# Patient Record
Sex: MALE | Race: WHITE | NOT HISPANIC OR LATINO | Employment: OTHER | URBAN - METROPOLITAN AREA
[De-identification: names, ages, dates, MRNs, and addresses within clinical notes are randomized per-mention and may not be internally consistent; named-entity substitution may affect disease eponyms.]

---

## 2021-12-20 ENCOUNTER — OFFICE VISIT (OUTPATIENT)
Dept: CARDIOLOGY | Facility: CLINIC | Age: 71
End: 2021-12-20
Payer: MEDICARE

## 2021-12-20 DIAGNOSIS — I63.231 CEREBROVASCULAR ACCIDENT (CVA) DUE TO STENOSIS OF RIGHT CAROTID ARTERY: ICD-10-CM

## 2021-12-20 PROBLEM — I63.9 CVA (CEREBRAL VASCULAR ACCIDENT): Status: ACTIVE | Noted: 2021-12-20

## 2021-12-20 PROCEDURE — 99203 PR OFFICE/OUTPT VISIT, NEW, LEVL III, 30-44 MIN: ICD-10-PCS | Mod: 95,,, | Performed by: INTERNAL MEDICINE

## 2021-12-20 PROCEDURE — 99203 OFFICE O/P NEW LOW 30 MIN: CPT | Mod: 95,,, | Performed by: INTERNAL MEDICINE

## 2022-01-07 ENCOUNTER — DOCUMENTATION ONLY (OUTPATIENT)
Dept: CARDIOLOGY | Facility: CLINIC | Age: 72
End: 2022-01-07
Payer: MEDICARE

## 2022-01-07 DIAGNOSIS — I63.231 CEREBROVASCULAR ACCIDENT (CVA) DUE TO STENOSIS OF RIGHT CAROTID ARTERY: Primary | ICD-10-CM

## 2022-01-07 RX ORDER — ALLOPURINOL 300 MG/1
300 TABLET ORAL DAILY
COMMUNITY
Start: 2021-08-05 | End: 2022-08-06

## 2022-01-07 RX ORDER — CLOPIDOGREL BISULFATE 75 MG/1
75 TABLET ORAL DAILY
COMMUNITY
Start: 2021-12-08

## 2022-01-07 RX ORDER — AMLODIPINE AND BENAZEPRIL HYDROCHLORIDE 5; 10 MG/1; MG/1
10 CAPSULE ORAL DAILY
Status: ON HOLD | COMMUNITY
Start: 2021-08-05 | End: 2022-04-22 | Stop reason: HOSPADM

## 2022-01-07 RX ORDER — CHOLECALCIFEROL (VITAMIN D3) 50 MCG
1000 CAPSULE ORAL 2 TIMES DAILY
COMMUNITY

## 2022-01-07 RX ORDER — SIMVASTATIN 80 MG/1
80 TABLET, FILM COATED ORAL DAILY
COMMUNITY
Start: 2021-08-05

## 2022-01-07 RX ORDER — ASPIRIN 81 MG/1
81 TABLET ORAL DAILY
COMMUNITY
Start: 2021-12-08 | End: 2022-12-09

## 2022-01-07 NOTE — PROGRESS NOTES
"OUTPATIENT CATHETERIZATION INSTRUCTIONS    You have been scheduled for a procedure in the catheterization lab on Monday, January 31, 2022.     Please report to the Cardiology Waiting Area on the Third floor of the hospital and check in at 6 AM.   You will then be taken to the SSCU (Short Stay Cardiac Unit) and prepared for your procedure. Please be aware that this is not the time of your procedure but the time you are to arrive. The procedures are scheduled on an hourly basis; however, emergency cases take precedence over all other cases.       You may not have anything to eat or drink after midnight the night before your test. You may take your regular morning medications with water. If there are any medications that you should not take you will be instructed to hold them that morning. If you are diabetic and on Metformin (Glucophage) do not take it the day before, the day of, and for 2 days after your procedure.      The procedure will take 1-2 hours to perform. After the procedure, you will return to SSCU on the third floor of the hospital. You will need to lie still (or keep your arm still) for the next 4 to 6 hours to minimize bleeding from the puncture site. Your family may remain in the room with you during this time.       You may be able to be discharged home that same afternoon if there is someone to drive you home and there were no complications. If you have one of the balloon, stent, or device procedures you may spend the night in the hospital. Your doctor will determine, based on your progress, the date and time of your discharge. The results of your procedure will be discussed with you before you are discharged. Any further testing or procedures will be scheduled for you either before you leave or you will be called with these appointments.       If you should have any questions, concerns, or need to change the date of your procedure, please call "CHACHO Steele @ [phone]    Special " Instructions:    Continue taking your prescribed medications.    Take your Aspirin and Plavix the morning of your procedure.    Get your labs and Covid test completed by Friday January 28, 2022.   Have them fax the results to [phone] ATTN: Ivette    Plan for a overnight stay in the hospital the day of your procedure.        THE ABOVE INSTRUCTIONS WERE GIVEN TO THE PATIENT VERBALLY AND THEY VERBALIZED UNDERSTANDING.  THEY DO NOT REQUIRE ANY SPECIAL NEEDS AND DO NOT HAVE ANY LEARNING BARRIERS.          Directions for Reporting to Cardiology Waiting Area in the Hospital  If you park in the Parking Garage:  Take elevators to the1st floor of the parking garage.  Continue past the gift shop, coffee shop, and piano.  Take a right and go to the gold elevators. (Elevator B)  Take the elevator to the 3rd floor.  Follow the arrow on the sign on the wall that says Cath Lab Registration/EP Lab Registration.  Follow the long hallway all the way around until you come to a big open area.  This is the registration area.  Check in at Reception Desk.    OR    If family is dropping you off:  Have them drop you off at the front of the Hospital under the green overhang.  Enter through the doors and take a right.  Take the E elevators to the 3rd floor Cardiology Waiting Area.  Check in at the Reception Desk in the waiting room.

## 2022-01-31 ENCOUNTER — HOSPITAL ENCOUNTER (INPATIENT)
Facility: HOSPITAL | Age: 72
LOS: 1 days | Discharge: HOME | DRG: 068 | End: 2022-01-31
Attending: INTERNAL MEDICINE | Admitting: INTERNAL MEDICINE
Payer: MEDICARE

## 2022-01-31 VITALS
OXYGEN SATURATION: 98 % | BODY MASS INDEX: 27.2 KG/M2 | HEIGHT: 70 IN | DIASTOLIC BLOOD PRESSURE: 63 MMHG | RESPIRATION RATE: 18 BRPM | WEIGHT: 190 LBS | SYSTOLIC BLOOD PRESSURE: 136 MMHG | HEART RATE: 83 BPM | TEMPERATURE: 99 F

## 2022-01-31 DIAGNOSIS — I65.23 BILATERAL CAROTID ARTERY STENOSIS: ICD-10-CM

## 2022-01-31 DIAGNOSIS — I63.231 CEREBROVASCULAR ACCIDENT (CVA) DUE TO STENOSIS OF RIGHT CAROTID ARTERY: Primary | ICD-10-CM

## 2022-01-31 DIAGNOSIS — Z51.89 ENCOUNTER FOR OTHER SPECIFIED AFTERCARE: ICD-10-CM

## 2022-01-31 DIAGNOSIS — Z01.810 PRE-PROCEDURAL CARDIOVASCULAR EXAMINATION: ICD-10-CM

## 2022-01-31 LAB
ABO + RH BLD: NORMAL
ANION GAP SERPL CALC-SCNC: 8 MMOL/L (ref 8–16)
BASOPHILS # BLD AUTO: 0.02 K/UL (ref 0–0.2)
BASOPHILS NFR BLD: 0.3 % (ref 0–1.9)
BLD GP AB SCN CELLS X3 SERPL QL: NORMAL
BUN SERPL-MCNC: 32 MG/DL (ref 8–23)
CALCIUM SERPL-MCNC: 9.7 MG/DL (ref 8.7–10.5)
CHLORIDE SERPL-SCNC: 110 MMOL/L (ref 95–110)
CO2 SERPL-SCNC: 23 MMOL/L (ref 23–29)
CREAT SERPL-MCNC: 1.8 MG/DL (ref 0.5–1.4)
DIFFERENTIAL METHOD: ABNORMAL
EOSINOPHIL # BLD AUTO: 0.1 K/UL (ref 0–0.5)
EOSINOPHIL NFR BLD: 1.6 % (ref 0–8)
ERYTHROCYTE [DISTWIDTH] IN BLOOD BY AUTOMATED COUNT: 12.8 % (ref 11.5–14.5)
EST. GFR  (AFRICAN AMERICAN): 42.8 ML/MIN/1.73 M^2
EST. GFR  (NON AFRICAN AMERICAN): 37 ML/MIN/1.73 M^2
GLUCOSE SERPL-MCNC: 187 MG/DL (ref 70–110)
HCT VFR BLD AUTO: 43 % (ref 40–54)
HGB BLD-MCNC: 14.8 G/DL (ref 14–18)
IMM GRANULOCYTES # BLD AUTO: 0.03 K/UL (ref 0–0.04)
IMM GRANULOCYTES NFR BLD AUTO: 0.5 % (ref 0–0.5)
LYMPHOCYTES # BLD AUTO: 1 K/UL (ref 1–4.8)
LYMPHOCYTES NFR BLD: 16.2 % (ref 18–48)
MCH RBC QN AUTO: 30 PG (ref 27–31)
MCHC RBC AUTO-ENTMCNC: 34.4 G/DL (ref 32–36)
MCV RBC AUTO: 87 FL (ref 82–98)
MONOCYTES # BLD AUTO: 0.5 K/UL (ref 0.3–1)
MONOCYTES NFR BLD: 8.3 % (ref 4–15)
NEUTROPHILS # BLD AUTO: 4.6 K/UL (ref 1.8–7.7)
NEUTROPHILS NFR BLD: 73.1 % (ref 38–73)
NRBC BLD-RTO: 0 /100 WBC
PLATELET # BLD AUTO: 109 K/UL (ref 150–450)
PMV BLD AUTO: 10.7 FL (ref 9.2–12.9)
POCT GLUCOSE: 201 MG/DL (ref 70–110)
POTASSIUM SERPL-SCNC: 4.6 MMOL/L (ref 3.5–5.1)
RBC # BLD AUTO: 4.93 M/UL (ref 4.6–6.2)
SODIUM SERPL-SCNC: 141 MMOL/L (ref 136–145)
WBC # BLD AUTO: 6.23 K/UL (ref 3.9–12.7)

## 2022-01-31 PROCEDURE — 25000003 PHARM REV CODE 250: Performed by: INTERNAL MEDICINE

## 2022-01-31 PROCEDURE — C1769 GUIDE WIRE: HCPCS | Performed by: INTERNAL MEDICINE

## 2022-01-31 PROCEDURE — 27201423 OPTIME MED/SURG SUP & DEVICES STERILE SUPPLY: Performed by: INTERNAL MEDICINE

## 2022-01-31 PROCEDURE — 36223 PLACE CATH CAROTID/INOM ART: CPT | Mod: 50,,, | Performed by: INTERNAL MEDICINE

## 2022-01-31 PROCEDURE — 80048 BASIC METABOLIC PNL TOTAL CA: CPT | Performed by: INTERNAL MEDICINE

## 2022-01-31 PROCEDURE — 85025 COMPLETE CBC W/AUTO DIFF WBC: CPT | Performed by: INTERNAL MEDICINE

## 2022-01-31 PROCEDURE — 11000001 HC ACUTE MED/SURG PRIVATE ROOM

## 2022-01-31 PROCEDURE — 63600175 PHARM REV CODE 636 W HCPCS: Performed by: INTERNAL MEDICINE

## 2022-01-31 PROCEDURE — 36223 PR ANGIO INTRCRANL ART +/- CERVIOCEREBRAL ARCH, CAROTID/INNOM ART,  SELCETV CATH, S&I: ICD-10-PCS | Mod: 50,,, | Performed by: INTERNAL MEDICINE

## 2022-01-31 PROCEDURE — 99233 SBSQ HOSP IP/OBS HIGH 50: CPT | Mod: GC,,, | Performed by: INTERNAL MEDICINE

## 2022-01-31 PROCEDURE — 36223 PLACE CATH CAROTID/INOM ART: CPT | Mod: 50 | Performed by: INTERNAL MEDICINE

## 2022-01-31 PROCEDURE — C1760 CLOSURE DEV, VASC: HCPCS | Performed by: INTERNAL MEDICINE

## 2022-01-31 PROCEDURE — 99233 PR SUBSEQUENT HOSPITAL CARE,LEVL III: ICD-10-PCS | Mod: GC,,, | Performed by: INTERNAL MEDICINE

## 2022-01-31 PROCEDURE — C1894 INTRO/SHEATH, NON-LASER: HCPCS | Performed by: INTERNAL MEDICINE

## 2022-01-31 PROCEDURE — 86850 RBC ANTIBODY SCREEN: CPT | Performed by: INTERNAL MEDICINE

## 2022-01-31 PROCEDURE — 25500020 PHARM REV CODE 255: Performed by: INTERNAL MEDICINE

## 2022-01-31 RX ORDER — HEPARIN SODIUM 1000 [USP'U]/ML
INJECTION, SOLUTION INTRAVENOUS; SUBCUTANEOUS
Status: DISCONTINUED | OUTPATIENT
Start: 2022-01-31 | End: 2022-01-31 | Stop reason: HOSPADM

## 2022-01-31 RX ORDER — GLIPIZIDE 10 MG/1
10 TABLET, FILM COATED, EXTENDED RELEASE ORAL 2 TIMES DAILY
COMMUNITY

## 2022-01-31 RX ORDER — ONDANSETRON 8 MG/1
8 TABLET, ORALLY DISINTEGRATING ORAL EVERY 8 HOURS PRN
Status: DISCONTINUED | OUTPATIENT
Start: 2022-01-31 | End: 2022-01-31 | Stop reason: HOSPADM

## 2022-01-31 RX ORDER — LIDOCAINE HYDROCHLORIDE 20 MG/ML
INJECTION, SOLUTION EPIDURAL; INFILTRATION; INTRACAUDAL; PERINEURAL
Status: DISCONTINUED | OUTPATIENT
Start: 2022-01-31 | End: 2022-01-31 | Stop reason: HOSPADM

## 2022-01-31 RX ORDER — DIPHENHYDRAMINE HCL 50 MG
50 CAPSULE ORAL ONCE
Status: COMPLETED | OUTPATIENT
Start: 2022-01-31 | End: 2022-01-31

## 2022-01-31 RX ORDER — NAPROXEN SODIUM 220 MG/1
81 TABLET, FILM COATED ORAL DAILY
Status: DISCONTINUED | OUTPATIENT
Start: 2022-01-31 | End: 2022-01-31 | Stop reason: HOSPADM

## 2022-01-31 RX ORDER — CEFAZOLIN SODIUM 1 G/3ML
INJECTION, POWDER, FOR SOLUTION INTRAMUSCULAR; INTRAVENOUS
Status: DISCONTINUED | OUTPATIENT
Start: 2022-01-31 | End: 2022-01-31 | Stop reason: HOSPADM

## 2022-01-31 RX ORDER — HEPARIN SOD,PORCINE/0.9 % NACL 1000/500ML
INTRAVENOUS SOLUTION INTRAVENOUS
Status: DISCONTINUED | OUTPATIENT
Start: 2022-01-31 | End: 2022-01-31 | Stop reason: HOSPADM

## 2022-01-31 RX ORDER — SODIUM CHLORIDE 9 MG/ML
INJECTION, SOLUTION INTRAVENOUS CONTINUOUS
Status: ACTIVE | OUTPATIENT
Start: 2022-01-31 | End: 2022-01-31

## 2022-01-31 RX ORDER — SODIUM CHLORIDE 9 MG/ML
INJECTION, SOLUTION INTRAVENOUS
Status: DISCONTINUED | OUTPATIENT
Start: 2022-01-31 | End: 2022-01-31 | Stop reason: HOSPADM

## 2022-01-31 RX ORDER — ACETAMINOPHEN 325 MG/1
650 TABLET ORAL EVERY 4 HOURS PRN
Status: DISCONTINUED | OUTPATIENT
Start: 2022-01-31 | End: 2022-01-31 | Stop reason: HOSPADM

## 2022-01-31 RX ORDER — CLOPIDOGREL BISULFATE 75 MG/1
75 TABLET ORAL DAILY
Status: DISCONTINUED | OUTPATIENT
Start: 2022-01-31 | End: 2022-01-31 | Stop reason: HOSPADM

## 2022-01-31 RX ORDER — TAMSULOSIN HYDROCHLORIDE 0.4 MG/1
CAPSULE ORAL DAILY
COMMUNITY

## 2022-01-31 RX ORDER — SODIUM CHLORIDE 9 MG/ML
INJECTION, SOLUTION INTRAVENOUS CONTINUOUS
Status: ACTIVE | OUTPATIENT
Start: 2022-01-31

## 2022-01-31 RX ADMIN — CLOPIDOGREL 75 MG: 75 TABLET, FILM COATED ORAL at 07:01

## 2022-01-31 RX ADMIN — SODIUM CHLORIDE: 0.9 INJECTION, SOLUTION INTRAVENOUS at 07:01

## 2022-01-31 RX ADMIN — ASPIRIN 81 MG CHEWABLE TABLET 81 MG: 81 TABLET CHEWABLE at 07:01

## 2022-01-31 RX ADMIN — DIPHENHYDRAMINE HYDROCHLORIDE 50 MG: 50 CAPSULE ORAL at 07:01

## 2022-01-31 NOTE — H&P
[facility], [place]  H&P  Interventional Cardiology      Migue Reason  YOB: 1950  Medical Record Number:  [id number]  Attending Physician:  Maurilio Howard MD   Date of Admission: 1/31/2022       Hospital Day:  0  Current Principal Problem:  <principal problem not specified>      History     Cc: SANGEETA stenosis    HPI  This is a 71-year-old male with history of CAD status post CABG in 2019 and Reny Arabia, HTN, DM,  familial hypercholesterolemia, prostate cancer s/p prostatectomy 2010 (currently undergoing radiation with 12 treatments left), and CVA x3 January 2019, February 2019, and October 2021 who presents to hospital for right internal carotid artery stenting.  Patient recently had carotid artery enterectomy in November, 2021 with repeat attempt in December, unsuccessful  which failed due to high lesion requiring disarticulation of the mandible to approach.  [facility]  Currently he denies any chest discomfort such as pain, tightness, or pressure is.  He denies the symptoms at rest as well as on exertion along with dyspnea.  He is compliant with his medications.  He does have residual left upper and lower extremity weakness from previous CVA.  He is working with physical therapy.       Medications - Outpatient  Prior to Admission medications    Medication Sig Start Date End Date Taking? Authorizing Provider   allopurinoL (ZYLOPRIM) 300 MG tablet Take 300 mg by mouth once daily. 8/5/21 8/6/22  Historical Provider   amlodipine-benazepril 5-10 mg (LOTREL) 5-10 mg per capsule Take 10 capsules by mouth once daily. 8/5/21 8/6/22  Historical Provider   aspirin (ECOTRIN) 81 MG EC tablet Take 81 mg by mouth once daily. 12/8/21 12/9/22  Historical Provider   clopidogreL (PLAVIX) 75 mg tablet Take 75 mg by mouth once daily. 12/8/21   Historical Provider   omega 3-dha-epa-fish oil (FISH OIL) 100-160-1,000 mg Cap Take 1,000 mg by mouth 2 (two)  times a day.    Historical Provider   simvastatin (ZOCOR) 80 MG tablet Take 80 mg by mouth once daily. 8/5/21   Historical Provider         Medications - Current  Scheduled Meds:   diphenhydrAMINE  50 mg Oral Once     Continuous Infusions:   sodium chloride 0.9%       PRN Meds:.      Allergies  Review of patient's allergies indicates:  No Known Allergies      Past Medical History  No past medical history on file.      Past Surgical History  No past surgical history on file.      Social History  Social History     Socioeconomic History    Marital status:          ROS   Admits Denies   Constitutional  Chills, diaphoresis, malaise   Eyes  Visual changes   ENMT  Dysphagia, Epistaxis, nasal congestion, hearing loss   Cardiovascular  Chest pain, palpitations, edema   Respiratory  Cough, dyspnea, wheezing   Gastrointestinal  Nausea, vomiting, constipation, diarrhea, anorexia.     Genitourinary  Dysuria, incontinence   Musculoskeletal  Myalgias, joint pain, joint swelling   Integumentary  Rash, inflammation, burning   Neruo-Psychiatric  Anxiety, insomnia.  Changes in speech, strength, sensation.     Endocrine     Hematologic  Abnormal bruising, bleeding   Immunologic  Inflammation, pain at IV sites.  Pruritis.         Physical Examination         Vital Signs             24 Hour VS Range       No intake or output data in the 24 hours ending 01/31/22 0656      General:  Lying in bed no acute distress  Head: NCAT  Eyes: conjunctivae and lids normal, no scleral icterus, EOMI.  ENMT:  no gingival bleeding, normal oral mucosa without pallor or cyanosis.   Neck:  JVP normal.  Trachea non-displaced.  3 cm incision site on right side of neck, healed without any swelling, erythema, or tenderness.    Chest:  Normal respiratory effort.  Chest clear to auscultation.  No wheezes, rales, or rhonchi.  Heart:  Normal PMI, S1 S2 normal quality, splitting.  No murmurs rubs or gallops.  Peripheral pulses 2+ in left carotid, bilateral  radials, bilateral femoral, left DP/PT.  1+ pulses in right DP/PT.  1+ right carotid.  Abdomen:  Non-distended, normal bowel sounds, non-tender.  No hepato-jugular reflux.  Extremities:  No edema. Normal capillary refill.    Skin:  Warm and dry.  No cyanosis or pallor.  No ulcers, stasis.  IV sites without tenderness or inflammation.    Neurological / Psychiatric:  Oriented to person, time, and place.  No facial asymmetry, drift.  Fluent without dysarthria.  Mood euthymic, affect normal.         Assessment & Plan     Right internal carotid artery stenosis  S/p carotid enterectomy attempt.  Patient deemed inoperable due to high anatomical risk now referred for carotid stenting due to inoperable anatomically.  -- PCI of  SANGEETA  - Anti-platelet Therapy:  Aspirin/Plavix  - Access:  Right CFA with left CFA as backup  - Access closure: TBD  - Creatinine/CrCl:   CrCl cannot be calculated (Patient's most recent lab result is older than the maximum 7 days allowed.).  - Allergies:   - No shellfish / Iodine allergy  - No Latex allergy   - No Aspirin allergy    - No history of HIT  - Pre-Hydration: NS 3cc/kg x 1 hour   - Pre-Op Med: Bendaryl 50mg pO   - All patient's questions were answered.  -The risks, benefits and alternatives of the procedure were explained to the patient.   -The risks of coronary angiography include but are not limited to: bleeding, infection, heart rhythm abnormalities, allergic reactions, kidney injury and potential need for dialysis, stroke and death.   - Should stenting be indicated, the patient has agreed to dual anti-platelet therapy for 1-consecutive year with a drug-eluting stent and a minimum of 1-month with the use of a bare metal stent  - Additionally, pt is aware that non-compliance is likely to result in stent clotting with heart attack, heart failure, and/or death  -The risks of moderate sedation include hypotension, respiratory depression, arrhythmias, bronchospasm, and death.   - Informed  consent was obtained and the  patient is agreeable to proceed with the procedure.    CAD status post CABG:  -continue home medications    Hypertension  -continue amlodipine    Diabetes mellitus:  -continue home med    Familial hypercholesterolemia  -on simvastatin will continue    Prostate cancer  -follow-up with urologist      Signed:  Asa Howard M.D.  Page # [phone]  Cardiovascular Fellow  [facility]

## 2022-01-31 NOTE — PROGRESS NOTES
Patient discharged per MD orders. Instructions given on medications, wound care, activity, signs of infection, when to call MD, and follow up appointments. Pt verbalized understanding.  Patient AAOx3, VSS, no c/o pain or discomfort at this time. Telemetry and PIV removed. Patient and family waiting for transport.

## 2022-01-31 NOTE — BRIEF OP NOTE
Brief Operative Note:    : Maurilio Howard MD     Referring Physician: Parth Meadows     All Operators: Surgeon(s):  MD Philip Miller MD James S. Jenkins, MD     Preoperative Diagnosis: Bilateral Carotid Artery Stenosis     Postop Diagnosis: Bilateral Carotid Artery Stenosis    Treatments/Procedures: Procedure(s) (LRB):  ANGIOGRAM, AORTIC ARCH AND 4 VESSEL (N/A)    Findings:  -Total occlusion of right ICA with collaterals filling from right external carotid and left posterior cerebral  -50% stenosis in ostial left common carotid atery   See catheterization report for full details.    Recommendations:  -continue medical management  -will bring back for left CCA stenting to allow better perfusion of right system via collaterals  -routine post cath care  -repeat BMP to follow up Creatinine in one week    Estimated Blood loss: 20 cc    Specimens removed: No    Signed:  Asa Howard MD  Page # [phone]  Cardiovascular Fellow  [facility]

## 2022-01-31 NOTE — PROGRESS NOTES
Report received from CHACHO Bryan. Patient s/p carotid angiogram. Dressing to R groin cdi, soft. + pulse. Post procedure protocol discussed with patient. IVF infusing. No complaints from patient, vss. Call light in reach.

## 2022-01-31 NOTE — DISCHARGE SUMMARY
Discharge Summary  Interventional Cardiology      Admit Date: 1/31/2022    Discharge Date :1/31/2022    Attending Physician:  Maurilio Howard    Discharge Physician: Asa Howard    Discharged Condition: good    Discharge Diagnosis: Bilateral Carotid Artery Stenosis    Treatments/Procedures: Procedure(s) (LRB):  ANGIOGRAM, AORTIC ARCH AND 4 VESSEL (N/A)    Hospital Course:     71-year-old male with history of CAD status post CABG in 2019 and Reny Arabia, HTN, DM,  familial hypercholesterolemia, prostate cancer s/p prostatectomy 2010 (currently undergoing radiation with 12 treatments left), and CVA x3 January 2019, February 2019, and October 2021 who presents to hospital for right internal carotid artery stenting.    Findings:  -Total occlusion of right ICA with collaterals filling from right external carotid and left posterior cerebral  -50% stenosis in ostial left common carotid atery   See catheterization report for full details.     Recommendations:  -continue medical management  -will bring back for left CCA stenting to allow better perfusion of right system via collaterals  -repeat BMP to follow up Creatinine in one week    Significant Diagnostic Studies: none    Disposition: Home or Self Care    Diet: Regular    Follow up: Office 10-14 days    Activity: No heavy lifting till seen in office.    Patient Instructions:   Discharge Medication List as of 1/31/2022  1:31 PM      CONTINUE these medications which have NOT CHANGED    Details   amlodipine-benazepril 5-10 mg (LOTREL) 5-10 mg per capsule Take 10 capsules by mouth once daily., Starting Thu 8/5/2021, Until Sat 8/6/2022, Historical Med      aspirin (ECOTRIN) 81 MG EC tablet Take 81 mg by mouth once daily., Starting Wed 12/8/2021, Until Fri 12/9/2022, Historical Med      clopidogreL (PLAVIX) 75 mg tablet Take 75 mg by mouth once daily., Starting Wed 12/8/2021, Historical Med      glipiZIDE (GLUCOTROL) 10 MG TR24 Take 10 mg by mouth 2 (two) times a  day., Historical Med      omega 3-dha-epa-fish oil (FISH OIL) 100-160-1,000 mg Cap Take 1,000 mg by mouth 2 (two) times a day., Historical Med      simvastatin (ZOCOR) 80 MG tablet Take 80 mg by mouth once daily., Starting Thu 8/5/2021, Historical Med      tamsulosin (FLOMAX) 0.4 mg Cap Take by mouth once daily., Historical Med      allopurinoL (ZYLOPRIM) 300 MG tablet Take 300 mg by mouth once daily., Starting Thu 8/5/2021, Until Sat 8/6/2022, Historical Med           Signed:  Asa Howard MD  Page # [phone]  Cardiovascular Fellow  [facility]

## 2022-03-25 ENCOUNTER — DOCUMENTATION ONLY (OUTPATIENT)
Dept: CARDIOLOGY | Facility: CLINIC | Age: 72
End: 2022-03-25
Payer: MEDICARE

## 2022-04-21 ENCOUNTER — HOSPITAL ENCOUNTER (INPATIENT)
Facility: HOSPITAL | Age: 72
LOS: 1 days | Discharge: HOME | DRG: 036 | End: 2022-04-22
Attending: INTERNAL MEDICINE | Admitting: INTERNAL MEDICINE
Payer: MEDICARE

## 2022-04-21 DIAGNOSIS — Z51.89 ENCOUNTER FOR OTHER SPECIFIED AFTERCARE: ICD-10-CM

## 2022-04-21 DIAGNOSIS — I65.23 OCCLUSION AND STENOSIS OF BILATERAL CAROTID ARTERIES: ICD-10-CM

## 2022-04-21 LAB
ABO + RH BLD: NORMAL
ANION GAP SERPL CALC-SCNC: 9 MMOL/L (ref 8–16)
BLD GP AB SCN CELLS X3 SERPL QL: NORMAL
BUN SERPL-MCNC: 36 MG/DL (ref 8–23)
CALCIUM SERPL-MCNC: 9.4 MG/DL (ref 8.7–10.5)
CHLORIDE SERPL-SCNC: 103 MMOL/L (ref 95–110)
CO2 SERPL-SCNC: 24 MMOL/L (ref 23–29)
CREAT SERPL-MCNC: 1.8 MG/DL (ref 0.5–1.4)
EST. GFR  (AFRICAN AMERICAN): 42.8 ML/MIN/1.73 M^2
EST. GFR  (NON AFRICAN AMERICAN): 37 ML/MIN/1.73 M^2
GLUCOSE SERPL-MCNC: 179 MG/DL (ref 70–110)
POCT GLUCOSE: 102 MG/DL (ref 70–110)
POCT GLUCOSE: 162 MG/DL (ref 70–110)
POCT GLUCOSE: 174 MG/DL (ref 70–110)
POTASSIUM SERPL-SCNC: 4.4 MMOL/L (ref 3.5–5.1)
SODIUM SERPL-SCNC: 136 MMOL/L (ref 136–145)

## 2022-04-21 PROCEDURE — 25000003 PHARM REV CODE 250: Performed by: INTERNAL MEDICINE

## 2022-04-21 PROCEDURE — 20600001 HC STEP DOWN PRIVATE ROOM

## 2022-04-21 PROCEDURE — C1884 EMBOLIZATION PROTECT SYST: HCPCS | Performed by: INTERNAL MEDICINE

## 2022-04-21 PROCEDURE — 63600175 PHARM REV CODE 636 W HCPCS: Performed by: INTERNAL MEDICINE

## 2022-04-21 PROCEDURE — 37215 TRANSCATH STENT CCA W/EPS: CPT | Performed by: INTERNAL MEDICINE

## 2022-04-21 PROCEDURE — 37215 TRANSCATH STENT CCA W/EPS: CPT | Mod: LT,,, | Performed by: INTERNAL MEDICINE

## 2022-04-21 PROCEDURE — C1760 CLOSURE DEV, VASC: HCPCS | Performed by: INTERNAL MEDICINE

## 2022-04-21 PROCEDURE — 25500020 PHARM REV CODE 255: Performed by: INTERNAL MEDICINE

## 2022-04-21 PROCEDURE — 80048 BASIC METABOLIC PNL TOTAL CA: CPT | Performed by: INTERNAL MEDICINE

## 2022-04-21 PROCEDURE — C1876 STENT, NON-COA/NON-COV W/DEL: HCPCS | Performed by: INTERNAL MEDICINE

## 2022-04-21 PROCEDURE — C1894 INTRO/SHEATH, NON-LASER: HCPCS | Performed by: INTERNAL MEDICINE

## 2022-04-21 PROCEDURE — 25000003 PHARM REV CODE 250: Performed by: STUDENT IN AN ORGANIZED HEALTH CARE EDUCATION/TRAINING PROGRAM

## 2022-04-21 PROCEDURE — 27201423 OPTIME MED/SURG SUP & DEVICES STERILE SUPPLY: Performed by: INTERNAL MEDICINE

## 2022-04-21 PROCEDURE — C1769 GUIDE WIRE: HCPCS | Performed by: INTERNAL MEDICINE

## 2022-04-21 PROCEDURE — 37215 PR CAROTID STENT PLACEMENT, CERVICAL: ICD-10-PCS | Mod: LT,,, | Performed by: INTERNAL MEDICINE

## 2022-04-21 PROCEDURE — 86901 BLOOD TYPING SEROLOGIC RH(D): CPT | Performed by: INTERNAL MEDICINE

## 2022-04-21 PROCEDURE — 85347 COAGULATION TIME ACTIVATED: CPT | Performed by: INTERNAL MEDICINE

## 2022-04-21 DEVICE — IMPLANTABLE DEVICE
Type: IMPLANTABLE DEVICE | Site: CAROTID | Status: FUNCTIONAL
Brand: CORDIS PRECISE PRO RX NITINOL STENT SYSTEM

## 2022-04-21 RX ORDER — NAPROXEN SODIUM 220 MG/1
81 TABLET, FILM COATED ORAL DAILY
Status: DISCONTINUED | OUTPATIENT
Start: 2022-04-22 | End: 2022-04-22 | Stop reason: HOSPADM

## 2022-04-21 RX ORDER — ATORVASTATIN CALCIUM 20 MG/1
80 TABLET, FILM COATED ORAL NIGHTLY
Status: DISCONTINUED | OUTPATIENT
Start: 2022-04-21 | End: 2022-04-22 | Stop reason: HOSPADM

## 2022-04-21 RX ORDER — SODIUM CHLORIDE 9 MG/ML
INJECTION, SOLUTION INTRAVENOUS CONTINUOUS
Status: ACTIVE | OUTPATIENT
Start: 2022-04-21

## 2022-04-21 RX ORDER — HEPARIN SOD,PORCINE/0.9 % NACL 1000/500ML
INTRAVENOUS SOLUTION INTRAVENOUS
Status: DISCONTINUED | OUTPATIENT
Start: 2022-04-21 | End: 2022-04-22 | Stop reason: HOSPADM

## 2022-04-21 RX ORDER — DIPHENHYDRAMINE HCL 50 MG
50 CAPSULE ORAL ONCE
Status: COMPLETED | OUTPATIENT
Start: 2022-04-21 | End: 2022-04-21

## 2022-04-21 RX ORDER — ACETAMINOPHEN 325 MG/1
650 TABLET ORAL EVERY 4 HOURS PRN
Status: DISCONTINUED | OUTPATIENT
Start: 2022-04-21 | End: 2022-04-22 | Stop reason: HOSPADM

## 2022-04-21 RX ORDER — IODIXANOL 320 MG/ML
INJECTION, SOLUTION INTRAVASCULAR
Status: DISCONTINUED | OUTPATIENT
Start: 2022-04-21 | End: 2022-04-22 | Stop reason: HOSPADM

## 2022-04-21 RX ORDER — ONDANSETRON 8 MG/1
8 TABLET, ORALLY DISINTEGRATING ORAL EVERY 8 HOURS PRN
Status: DISCONTINUED | OUTPATIENT
Start: 2022-04-21 | End: 2022-04-22 | Stop reason: HOSPADM

## 2022-04-21 RX ORDER — CLOPIDOGREL BISULFATE 75 MG/1
75 TABLET ORAL DAILY
Status: DISCONTINUED | OUTPATIENT
Start: 2022-04-22 | End: 2022-04-22 | Stop reason: HOSPADM

## 2022-04-21 RX ORDER — HEPARIN SODIUM 1000 [USP'U]/ML
INJECTION, SOLUTION INTRAVENOUS; SUBCUTANEOUS
Status: DISCONTINUED | OUTPATIENT
Start: 2022-04-21 | End: 2022-04-22 | Stop reason: HOSPADM

## 2022-04-21 RX ORDER — LIDOCAINE HYDROCHLORIDE 20 MG/ML
INJECTION, SOLUTION INFILTRATION; PERINEURAL
Status: DISCONTINUED | OUTPATIENT
Start: 2022-04-21 | End: 2022-04-22 | Stop reason: HOSPADM

## 2022-04-21 RX ORDER — SODIUM CHLORIDE 9 MG/ML
INJECTION, SOLUTION INTRAVENOUS CONTINUOUS
Status: DISCONTINUED | OUTPATIENT
Start: 2022-04-21 | End: 2022-04-22 | Stop reason: HOSPADM

## 2022-04-21 RX ADMIN — DIPHENHYDRAMINE HYDROCHLORIDE 50 MG: 50 CAPSULE ORAL at 07:04

## 2022-04-21 RX ADMIN — SODIUM CHLORIDE: 0.9 INJECTION, SOLUTION INTRAVENOUS at 11:04

## 2022-04-21 RX ADMIN — ATORVASTATIN CALCIUM 80 MG: 20 TABLET, FILM COATED ORAL at 10:04

## 2022-04-21 RX ADMIN — SODIUM CHLORIDE: 0.9 INJECTION, SOLUTION INTRAVENOUS at 12:04

## 2022-04-21 RX ADMIN — SODIUM CHLORIDE: 0.9 INJECTION, SOLUTION INTRAVENOUS at 07:04

## 2022-04-21 NOTE — H&P
Interventional Cardiology Pre Cath H&P    History of Present Illness:  Migue Duron is a 71 y.o. male with history of CAD status post CABG in 2019 and Reny Arabia, HTN, DM,  familial hypercholesterolemia, prostate cancer s/p prostatectomy 2010 (currently undergoing radiation with 12 treatments left), and CVA x3 January 2019, February 2019, and October 2021 who presents to hospital for left internal carotid artery stenting.  Patient recently had carotid artery enterectomy in November, 2021 with repeat attempt in December, unsuccessful  which failed due to high lesion requiring disarticulation of the mandible to approach.  [facility]  In 1/31/2022 he had angiogram that showed the following:     · The Prox R ICA lesion was 100% stenosed.  · The Ost R [facility] lesion was 80% stenosed.  · The Prox L ICA lesion was 60% stenosed.  · The estimated blood loss was none.  · Cr in 3 days.  · Follow up in 2 to 4 weeks for LICA stent.           He has no complaints today. Patient is here today for left ICA stenting.     Home Medications:  Current Facility-Administered Medications on File Prior to Encounter   Medication Dose Route Frequency Provider Last Rate Last Admin    0.9%  NaCl infusion   Intravenous Continuous Maurilio Howard  mL/hr at 01/31/22 0726 New Bag at 01/31/22 0726     Current Outpatient Medications on File Prior to Encounter   Medication Sig Dispense Refill    allopurinoL (ZYLOPRIM) 300 MG tablet Take 300 mg by mouth once daily.      amlodipine-benazepril 5-10 mg (LOTREL) 5-10 mg per capsule Take 10 capsules by mouth once daily.      aspirin (ECOTRIN) 81 MG EC tablet Take 81 mg by mouth once daily.      clopidogreL (PLAVIX) 75 mg tablet Take 75 mg by mouth once daily.      glipiZIDE (GLUCOTROL) 10 MG TR24 Take 10 mg by mouth 2 (two) times a day.      omega 3-dha-epa-fish oil (FISH OIL) 100-160-1,000 mg Cap Take 1,000 mg by mouth 2 (two) times a day.       "simvastatin (ZOCOR) 80 MG tablet Take 80 mg by mouth once daily.      tamsulosin (FLOMAX) 0.4 mg Cap Take by mouth once daily.         Allergies:  Review of patient's allergies indicates:  No Known Allergies    Past Medical History:  Past Medical History:   Diagnosis Date    Arthritis     Cancer     Coronary artery disease     Diabetes mellitus     Hypertension     Renal disorder     Stroke        Past Surgical History:  Past Surgical History:   Procedure Laterality Date    CARDIAC SURGERY      PROSTATECTOMY      ROTATOR CUFF REPAIR Bilateral        Social History:  Social History     Socioeconomic History    Marital status:    Tobacco Use    Smoking status: Current Every Day Smoker     Packs/day: 1.00     Types: Cigarettes    Smokeless tobacco: Never Used   Substance and Sexual Activity    Alcohol use: Not Currently    Drug use: Never    Sexual activity: Not Currently       Family History:  History reviewed. No pertinent family history.    Review of Systems:  Per HPI, otherwise 12pt ROS negative    Physical Exam:  Vital Signs- /68 (BP Location: Right arm, Patient Position: Lying)   Pulse 75   Temp 98.2 °F (36.8 °C) (Temporal)   Resp 16   Ht 5' 10" (1.778 m)   Wt 86.2 kg (190 lb)   SpO2 97%   BMI 27.26 kg/m²   Gen- appears stated age, in no acute distress  HEENT- NCAT, mucus membranes moist, no oropharyngeal exudate or erythema  Skin- warm and dry, no rashes or lesions  Neck- full ROM  Resp- no respiratory distress, no accessory muscle use  CV- regular rate, radial pulses +2 bilaterally  Ext- no clubbing, cyanosis or edema  MSK- full ROM in bilateral UE and LE  Neuro- AAOx3  Psych- mood and affect appropriate for setting      Labs and Imaging:  Hgbn 14.8  Plt 109  Cr 1.8    Assessment and Plan:  Migue Duron is a 71 y.o. male ith history of CAD status post CABG in 2019 and Reny Arabia, HTN, DM,  familial hypercholesterolemia, prostate cancer s/p prostatectomy 2010 (currently " undergoing radiation with 12 treatments left), and CVA x3 January 2019, February 2019, and October 2021 who presents to hospital for left internal carotid artery stenting.     - Proceed with coronary angiogram +/- intervention  - Anti-platelet Therapy: ASA + Plavix  - Creatinine/CrCl: 1.8 today, at baseline  - Allergies: No shellfish/Iodine allergy  - Pre-Hydration: 3 cc/kg/hr IV, continuous, for 1 hour, Pre-Procedure  - Pre-Op Med: Diphenhydramine (Benadryl) 50 mg, Oral, Once, Pre-Procedure   - The risks, benefits & alternatives of the procedure were explained to the patient.  The risks of angiography include but are not limited to: Bleeding, infection, heart rhythm abnormalities, allergic reactions, kidney injury requiring dilaysis, limb loss, stroke and death. Should stenting be indicated, the patient has agreed to dual anti-platelet therapy for 1-consecutive year. Additionally, patient is aware that non-compliance is likely to result in stent clotting with heart attack, heart failure, and/or death. The risks of moderate sedation include hypotension, respiratory depression, arrhythmias, bronchospasm, & death. Informed consent was obtained & the patient is agreeable to proceed with the procedure. All patient's questions were answered.     The risks (including death, heart attack, limb loss, paralysis, emergency surgery, bleeding, renal failure, and stroke), the potential benefits of the procedure, and the alternatives to the procedure, were discussed in detail and accepted by the patient. All questions were answered. Patient agrees to proceed.      Ronald Breen MD  Interventional Cardiology Fellow, PGY-7  4/21/2022  8:22 AM

## 2022-04-21 NOTE — BRIEF OP NOTE
Brief Operative Note:    : Maurilio Howard MD     Referring Physician: Parth Meadows     All Operators: Surgeon(s):  MD Ronald Chou MD Christopher M McKinnie, MD Aashish Gupta, MD James S. Jenkins, MD     Preoperative Diagnosis: Occlusion and stenosis of bilateral carotid arteries [I65.23]     Postop Diagnosis: Occlusion and stenosis of bilateral carotid arteries [I65.23]    Treatments/Procedures: Procedure(s) (LRB):  ANGIOGRAM, AORTIC ARCH AND 4 VESSEL (N/A)  STENT, CAROTID W/ PROTECT    Access: R CFA access    Findings: Bilateral carotid stenosis disease is present.     See catheterization report for full details.    Intervention: Bare metal [id number] Precise stent placed to [facility]/proxLICA    See catheterization report for full details.    Closure device: Perclose       Plan:  - Post cath protocol   - IVF @ 150 cc/kg/hr x 4 hours  - Bed rest x 4 hours   - Continue aspirin 81 mg daily indefinitely  - Continue Plavix for one month then continue aspirin indefinitely  - Continue high intensity statin therapy (LDL goal < 70)  - Risk factor reduction (BP <130/80 mmHg, glycemic control, etc)  -Patient to be admitted to floor    - Follow up with outpatient cardiologist    Estimated Blood loss: 20 cc    Specimens removed: No    Asa Neely MD  Interventional Cardiology PGY-4  [unfilled]

## 2022-04-21 NOTE — NURSING TRANSFER
Nursing Transfer Note      4/21/2022     Reason patient is being transferred: s/p cath lab    Transfer From: cath lab to room 308    Transfer via stretcher    Transfer with cardiac monitoring    Transported by RN    Medicines sent: na    Any special needs or follow-up needed: monitor groin site    Chart send with patient: Yes    Notified: daughter at bedside    Patient reassessed at: 04/21/22 1137    Upon arrival to floor: cardiac monitor applied, patient oriented to room, call bell in reach and bed in lowest position. Patient transferred from stretcher to bed. Patient instructed to keep from moving right LE. Right groin site soft, no bleeding or hematoma noted. HOB elevated to 30 degrees. Patient on bedrest for 2 hours post perclose ada at 1108. Family at bedside call light in reach.

## 2022-04-21 NOTE — PLAN OF CARE
Patient arrived to room. PIV placed x2, labs sent. Admit assessment completed. Plan of care discussed with patient. Will monitor. Call light within reach, instructed in use.

## 2022-04-21 NOTE — NURSING
Patient ambulated to BR and in landis. No bleeding or hematoma noted to right groin. No reports of dizziness. Patient assisted back to bed.

## 2022-04-21 NOTE — PLAN OF CARE
POC reviewed with patient and family at bedside. VSS. Soft BP with MAP > 60. Right groin remains soft with no further bleeding and no hematoma present. Denies pain or SOB. HOB elevated to 60 degrees. No complications noted. Call light in reach. Family at bedside.  Problem: Adult Inpatient Plan of Care  Goal: Plan of Care Review  Outcome: Ongoing, Progressing  Goal: Patient-Specific Goal (Individualized)  Outcome: Ongoing, Progressing  Goal: Absence of Hospital-Acquired Illness or Injury  Outcome: Ongoing, Progressing  Goal: Optimal Comfort and Wellbeing  Outcome: Ongoing, Progressing  Goal: Readiness for Transition of Care  Outcome: Ongoing, Progressing     Problem: Skin Injury Risk Increased  Goal: Skin Health and Integrity  Outcome: Ongoing, Progressing

## 2022-04-22 VITALS
BODY MASS INDEX: 27.2 KG/M2 | DIASTOLIC BLOOD PRESSURE: 54 MMHG | OXYGEN SATURATION: 96 % | HEIGHT: 70 IN | HEART RATE: 75 BPM | WEIGHT: 190 LBS | SYSTOLIC BLOOD PRESSURE: 91 MMHG | RESPIRATION RATE: 18 BRPM | TEMPERATURE: 98 F

## 2022-04-22 PROBLEM — I65.29 CAROTID STENOSIS: Status: ACTIVE | Noted: 2022-04-22

## 2022-04-22 LAB
ANION GAP SERPL CALC-SCNC: 8 MMOL/L (ref 8–16)
BASOPHILS # BLD AUTO: 0.01 K/UL (ref 0–0.2)
BASOPHILS # BLD AUTO: 0.01 K/UL (ref 0–0.2)
BASOPHILS NFR BLD: 0.2 % (ref 0–1.9)
BASOPHILS NFR BLD: 0.2 % (ref 0–1.9)
BUN SERPL-MCNC: 37 MG/DL (ref 8–23)
CALCIUM SERPL-MCNC: 9 MG/DL (ref 8.7–10.5)
CHLORIDE SERPL-SCNC: 107 MMOL/L (ref 95–110)
CO2 SERPL-SCNC: 24 MMOL/L (ref 23–29)
CREAT SERPL-MCNC: 2.1 MG/DL (ref 0.5–1.4)
DIFFERENTIAL METHOD: ABNORMAL
DIFFERENTIAL METHOD: ABNORMAL
EOSINOPHIL # BLD AUTO: 0.1 K/UL (ref 0–0.5)
EOSINOPHIL # BLD AUTO: 0.1 K/UL (ref 0–0.5)
EOSINOPHIL NFR BLD: 1.4 % (ref 0–8)
EOSINOPHIL NFR BLD: 1.6 % (ref 0–8)
ERYTHROCYTE [DISTWIDTH] IN BLOOD BY AUTOMATED COUNT: 12.8 % (ref 11.5–14.5)
ERYTHROCYTE [DISTWIDTH] IN BLOOD BY AUTOMATED COUNT: 12.8 % (ref 11.5–14.5)
EST. GFR  (AFRICAN AMERICAN): 35.5 ML/MIN/1.73 M^2
EST. GFR  (NON AFRICAN AMERICAN): 30.7 ML/MIN/1.73 M^2
GLUCOSE SERPL-MCNC: 125 MG/DL (ref 70–110)
HCT VFR BLD AUTO: 37.6 % (ref 40–54)
HCT VFR BLD AUTO: 37.7 % (ref 40–54)
HGB BLD-MCNC: 12.7 G/DL (ref 14–18)
HGB BLD-MCNC: 12.7 G/DL (ref 14–18)
IMM GRANULOCYTES # BLD AUTO: 0.02 K/UL (ref 0–0.04)
IMM GRANULOCYTES # BLD AUTO: 0.03 K/UL (ref 0–0.04)
IMM GRANULOCYTES NFR BLD AUTO: 0.3 % (ref 0–0.5)
IMM GRANULOCYTES NFR BLD AUTO: 0.5 % (ref 0–0.5)
LYMPHOCYTES # BLD AUTO: 1.2 K/UL (ref 1–4.8)
LYMPHOCYTES # BLD AUTO: 1.3 K/UL (ref 1–4.8)
LYMPHOCYTES NFR BLD: 19.9 % (ref 18–48)
LYMPHOCYTES NFR BLD: 21.2 % (ref 18–48)
MCH RBC QN AUTO: 30.6 PG (ref 27–31)
MCH RBC QN AUTO: 30.8 PG (ref 27–31)
MCHC RBC AUTO-ENTMCNC: 33.7 G/DL (ref 32–36)
MCHC RBC AUTO-ENTMCNC: 33.8 G/DL (ref 32–36)
MCV RBC AUTO: 91 FL (ref 82–98)
MCV RBC AUTO: 91 FL (ref 82–98)
MONOCYTES # BLD AUTO: 0.5 K/UL (ref 0.3–1)
MONOCYTES # BLD AUTO: 0.5 K/UL (ref 0.3–1)
MONOCYTES NFR BLD: 7.7 % (ref 4–15)
MONOCYTES NFR BLD: 8.7 % (ref 4–15)
NEUTROPHILS # BLD AUTO: 4.3 K/UL (ref 1.8–7.7)
NEUTROPHILS # BLD AUTO: 4.3 K/UL (ref 1.8–7.7)
NEUTROPHILS NFR BLD: 69.1 % (ref 38–73)
NEUTROPHILS NFR BLD: 69.2 % (ref 38–73)
NRBC BLD-RTO: 0 /100 WBC
NRBC BLD-RTO: 0 /100 WBC
PLATELET # BLD AUTO: 103 K/UL (ref 150–450)
PLATELET # BLD AUTO: 96 K/UL (ref 150–450)
PMV BLD AUTO: 11.1 FL (ref 9.2–12.9)
PMV BLD AUTO: 11.3 FL (ref 9.2–12.9)
POTASSIUM SERPL-SCNC: 4.7 MMOL/L (ref 3.5–5.1)
RBC # BLD AUTO: 4.12 M/UL (ref 4.6–6.2)
RBC # BLD AUTO: 4.15 M/UL (ref 4.6–6.2)
SODIUM SERPL-SCNC: 139 MMOL/L (ref 136–145)
WBC # BLD AUTO: 6.24 K/UL (ref 3.9–12.7)
WBC # BLD AUTO: 6.24 K/UL (ref 3.9–12.7)

## 2022-04-22 PROCEDURE — 80048 BASIC METABOLIC PNL TOTAL CA: CPT | Performed by: STUDENT IN AN ORGANIZED HEALTH CARE EDUCATION/TRAINING PROGRAM

## 2022-04-22 PROCEDURE — 25000003 PHARM REV CODE 250: Performed by: STUDENT IN AN ORGANIZED HEALTH CARE EDUCATION/TRAINING PROGRAM

## 2022-04-22 PROCEDURE — 36415 COLL VENOUS BLD VENIPUNCTURE: CPT | Performed by: STUDENT IN AN ORGANIZED HEALTH CARE EDUCATION/TRAINING PROGRAM

## 2022-04-22 PROCEDURE — 85025 COMPLETE CBC W/AUTO DIFF WBC: CPT | Mod: 91 | Performed by: STUDENT IN AN ORGANIZED HEALTH CARE EDUCATION/TRAINING PROGRAM

## 2022-04-22 PROCEDURE — 99024 PR POST-OP FOLLOW-UP VISIT: ICD-10-PCS | Mod: GC,,, | Performed by: INTERNAL MEDICINE

## 2022-04-22 PROCEDURE — 99024 POSTOP FOLLOW-UP VISIT: CPT | Mod: GC,,, | Performed by: INTERNAL MEDICINE

## 2022-04-22 RX ORDER — SODIUM CHLORIDE 9 MG/ML
INJECTION, SOLUTION INTRAVENOUS CONTINUOUS
Status: ACTIVE | OUTPATIENT
Start: 2022-04-22 | End: 2022-04-22

## 2022-04-22 RX ADMIN — CLOPIDOGREL 75 MG: 75 TABLET, FILM COATED ORAL at 08:04

## 2022-04-22 RX ADMIN — SODIUM CHLORIDE: 0.9 INJECTION, SOLUTION INTRAVENOUS at 08:04

## 2022-04-22 RX ADMIN — ASPIRIN 81 MG CHEWABLE TABLET 81 MG: 81 TABLET CHEWABLE at 08:04

## 2022-04-22 NOTE — DISCHARGE SUMMARY
Discharge Summary  Interventional Cardiology      Admit Date: 4/21/2022    Discharge Date:  4/22/2022    Attending Physician: Maurilio Howard MD    Discharge Physician: Dr. Neely    Principal Diagnoses: Carotid stenosis  Indication for Admission: ANGIOGRAM, AORTIC ARCH AND 4 VESSEL (N/A), STENT, CAROTID W/ PROTECT    Discharged Condition: Good    Hospital Course:   Patient presented for outpatient carotid angiogram which went without complication. Carotid angiogram revealed LICA stenosis. See full cath report in Epic for details. Hemostasis of patient's R CFA access site was achieved with Perclosure. Patient was monitored per post-cath protocol, and his R groin access site was c/d/i with no hematoma. Patient was able to ambulate without difficulty. He was feeling well and anticipating discharge home today.     Outpatient Plan:  - Medication changes/ additions include: ASA 81 mg indefinitely and Plavix 75 mg qd for one month    Diet: Cardiac diet    Activity: As tolerated    Disposition: Home or Self Care    Follow Up:    Discharge Medications:      Medication List      ASK your doctor about these medications    allopurinoL 300 MG tablet  Commonly known as: ZYLOPRIM     amlodipine-benazepril 5-10 mg 5-10 mg per capsule  Commonly known as: LOTREL     aspirin 81 MG EC tablet  Commonly known as: ECOTRIN     clopidogreL 75 mg tablet  Commonly known as: PLAVIX     Fish OiL 100-160-1,000 mg Cap  Generic drug: omega 3-dha-epa-fish oil     glipiZIDE 10 MG Tr24  Commonly known as: GLUCOTROL     simvastatin 80 MG tablet  Commonly known as: ZOCOR     tamsulosin 0.4 mg Cap  Commonly known as: FLOMAX          Patient will need outpatient BMP to follow up creatinine. Stop blood pressure medication until seen in outpatient follow up by PCP.    Asa Neely  Interventional Cardiology Fellow PGY-4  04/22/2022

## 2022-04-22 NOTE — NURSING
"Patient is alert x4. No complaints at the moment, B/p on lower side. Md aware, patient is asymptomatic. R groin site free from hematoma, no bleeding noted. Will continue to monitor.      BP (!) 98/57 (BP Location: Left arm, Patient Position: Lying)   Pulse (!) 53   Temp 98.2 °F (36.8 °C) (Oral)   Resp 20   Ht 5' 10" (1.778 m)   Wt 86.2 kg (190 lb)   SpO2 98%   BMI 27.26 kg/m²     " pt AOX4 c/o feeling fatigue, chest discomfort, pt went to [facility] felt burning on urination and frequency, pt went to [facility] which showed microscopic blood in urine and was placed on Sulfamethoxazole, pt also felling dizzy and fatigue

## 2022-04-22 NOTE — NURSING
BP and HR low but MD aware; patient asymptomatic. Patient discharged in no acute distress.  IV and telemetry removed.  Discharge instructions reviewed; all questions answered.  All belongings accounted for.

## 2022-04-25 LAB
POC ACTIVATED CLOTTING TIME K: 345 SEC (ref 74–137)
POCT GLUCOSE: 120 MG/DL (ref 70–110)
SAMPLE: ABNORMAL

## (undated) DEVICE — PROTECTION STATION PLUS

## (undated) DEVICE — KIT MICROINTRO 4F .018X40X7CM

## (undated) DEVICE — INFLATOR ENCORE 26 BLLN INFL

## (undated) DEVICE — SHEATH SHUTTLE 6FR FLEX

## (undated) DEVICE — GUIDEWIRE SUPRA CORE 035 190CM

## (undated) DEVICE — GUIDEWIRE EMERALD 150CM PTFE

## (undated) DEVICE — PAD DEFIB CADENCE ADULT R2

## (undated) DEVICE — GUIDEWIRE AMPLATZ .035X260

## (undated) DEVICE — GUIDEWIRE STD .035X180CM ANG

## (undated) DEVICE — VISIPAQUE 320 200ML +PK

## (undated) DEVICE — CATH EMBOSHIELD NAV6 7.2X190CM

## (undated) DEVICE — CATH BERNSTAN 5FR

## (undated) DEVICE — SPIKE CONTRAST CONTROLLER

## (undated) DEVICE — SHEATH INTRODUCER 5F 10CM

## (undated) DEVICE — KIT CUSTOM MANIFOLD

## (undated) DEVICE — Device

## (undated) DEVICE — DEVICE CLOSURE MYNX GRIP 5FR

## (undated) DEVICE — DEVICE PERCLOSE SUT CLSR 6FR

## (undated) DEVICE — SEE MEDLINE ITEM 156894

## (undated) DEVICE — KIT CO-PILOT

## (undated) DEVICE — SHEATH INTRODUCER 6FR 11CM